# Patient Record
Sex: MALE | Race: WHITE | NOT HISPANIC OR LATINO | Employment: FULL TIME | URBAN - METROPOLITAN AREA
[De-identification: names, ages, dates, MRNs, and addresses within clinical notes are randomized per-mention and may not be internally consistent; named-entity substitution may affect disease eponyms.]

---

## 2020-01-23 ENCOUNTER — OFFICE VISIT (OUTPATIENT)
Dept: URGENT CARE | Facility: CLINIC | Age: 55
End: 2020-01-23

## 2020-01-23 VITALS
DIASTOLIC BLOOD PRESSURE: 93 MMHG | HEART RATE: 107 BPM | OXYGEN SATURATION: 97 % | RESPIRATION RATE: 18 BRPM | HEIGHT: 67 IN | SYSTOLIC BLOOD PRESSURE: 143 MMHG | TEMPERATURE: 100 F | BODY MASS INDEX: 36.57 KG/M2 | WEIGHT: 233 LBS

## 2020-01-23 DIAGNOSIS — L03.116 CELLULITIS OF LEFT KNEE: Primary | ICD-10-CM

## 2020-01-23 PROCEDURE — 99214 OFFICE O/P EST MOD 30 MIN: CPT | Mod: TIER,S$GLB,, | Performed by: FAMILY MEDICINE

## 2020-01-23 PROCEDURE — 99214 PR OFFICE/OUTPT VISIT, EST, LEVL IV, 30-39 MIN: ICD-10-PCS | Mod: TIER,S$GLB,, | Performed by: FAMILY MEDICINE

## 2020-01-23 RX ORDER — SULFAMETHOXAZOLE AND TRIMETHOPRIM 800; 160 MG/1; MG/1
1 TABLET ORAL 2 TIMES DAILY
Qty: 20 TABLET | Refills: 0 | Status: SHIPPED | OUTPATIENT
Start: 2020-01-23

## 2020-01-23 RX ORDER — CLINDAMYCIN HYDROCHLORIDE 300 MG/1
300 CAPSULE ORAL 3 TIMES DAILY
Qty: 30 CAPSULE | Refills: 0 | Status: SHIPPED | OUTPATIENT
Start: 2020-01-23 | End: 2020-02-02

## 2020-01-23 NOTE — PROGRESS NOTES
"Subjective:       Patient ID: Guillermo Garcia is a 54 y.o. male.    Vitals:  height is 5' 6.5" (1.689 m) and weight is 105.7 kg (233 lb). His oral temperature is 99.9 °F (37.7 °C). His blood pressure is 143/93 (abnormal) and his pulse is 107. His respiration is 18 and oxygen saturation is 97%.     Chief Complaint: Knee Pain    Pt present today with Lt knee swelling. Symptoms started before New Years, pt has been hitting it off and on.     Knee Pain    The incident occurred more than 1 week ago. The incident occurred at home. The injury mechanism was a direct blow. The pain is present in the left knee. The quality of the pain is described as aching and shooting. The pain is at a severity of 10/10. The pain is mild. The pain has been constant since onset. Associated symptoms include an inability to bear weight. Pertinent negatives include no loss of motion, loss of sensation, muscle weakness, numbness or tingling. It is unknown if a foreign body is present. He has tried acetaminophen for the symptoms. The treatment provided no relief.       Musculoskeletal: Positive for pain, joint swelling and pain with walking.   Skin: Positive for color change.   Neurological: Negative for numbness.       Objective:      Physical Exam   Constitutional: He is oriented to person, place, and time. He appears well-developed and well-nourished. He is cooperative.  Non-toxic appearance. He does not appear ill. No distress.   HENT:   Head: Normocephalic and atraumatic.   Right Ear: Hearing, tympanic membrane and ear canal normal.   Left Ear: Hearing, tympanic membrane and ear canal normal.   Nose: Nose normal. No mucosal edema, rhinorrhea or nasal deformity. No epistaxis. Right sinus exhibits no maxillary sinus tenderness and no frontal sinus tenderness. Left sinus exhibits no maxillary sinus tenderness and no frontal sinus tenderness.   Mouth/Throat: Uvula is midline and mucous membranes are normal. No trismus in the jaw. Normal " dentition. No uvula swelling. No posterior oropharyngeal erythema.   Eyes: Conjunctivae and lids are normal. Right eye exhibits no discharge. Left eye exhibits no discharge. No scleral icterus.   Neck: Trachea normal, normal range of motion, full passive range of motion without pain and phonation normal. Neck supple.   Cardiovascular: Normal rate, intact distal pulses and normal pulses.   Pulmonary/Chest: Effort normal. No respiratory distress.   Abdominal: Normal appearance. He exhibits no distension, no pulsatile midline mass and no mass. There is no tenderness.   Musculoskeletal: He exhibits tenderness. He exhibits no edema or deformity.        Left knee: He exhibits swelling and erythema.        Legs:  Neurological: He is alert and oriented to person, place, and time. He exhibits normal muscle tone. Coordination normal.   Skin: Skin is warm, dry, intact, not diaphoretic and not pale.   Psychiatric: He has a normal mood and affect. His speech is normal and behavior is normal. Judgment and thought content normal. Cognition and memory are normal.   Nursing note and vitals reviewed.        Assessment:       1. Cellulitis of left knee        Plan:         Cellulitis of left knee    Other orders  -     sulfamethoxazole-trimethoprim 800-160mg (BACTRIM DS) 800-160 mg Tab; Take 1 tablet by mouth 2 (two) times daily.  Dispense: 20 tablet; Refill: 0  -     clindamycin (CLEOCIN) 300 MG capsule; Take 1 capsule (300 mg total) by mouth 3 (three) times daily. for 10 days  Dispense: 30 capsule; Refill: 0    appears to be superficial cellulitis- d/w pt regarding worsening signs and sx for a septic joint and given strict ER precautions.

## 2021-04-17 ENCOUNTER — IMMUNIZATION (OUTPATIENT)
Dept: PRIMARY CARE CLINIC | Facility: CLINIC | Age: 56
End: 2021-04-17

## 2021-04-17 DIAGNOSIS — Z23 NEED FOR VACCINATION: Primary | ICD-10-CM

## 2021-04-17 PROCEDURE — 91300 COVID-19, MRNA, LNP-S, PF, 30 MCG/0.3 ML DOSE VACCINE: CPT | Mod: S$GLB,,, | Performed by: FAMILY MEDICINE

## 2021-04-17 PROCEDURE — 0001A COVID-19, MRNA, LNP-S, PF, 30 MCG/0.3 ML DOSE VACCINE: CPT | Mod: CV19,S$GLB,, | Performed by: FAMILY MEDICINE

## 2021-04-17 PROCEDURE — 0001A COVID-19, MRNA, LNP-S, PF, 30 MCG/0.3 ML DOSE VACCINE: ICD-10-PCS | Mod: CV19,S$GLB,, | Performed by: FAMILY MEDICINE

## 2021-04-17 PROCEDURE — 91300 COVID-19, MRNA, LNP-S, PF, 30 MCG/0.3 ML DOSE VACCINE: ICD-10-PCS | Mod: S$GLB,,, | Performed by: FAMILY MEDICINE

## 2021-05-10 ENCOUNTER — IMMUNIZATION (OUTPATIENT)
Dept: PRIMARY CARE CLINIC | Facility: CLINIC | Age: 56
End: 2021-05-10

## 2021-05-10 DIAGNOSIS — Z23 NEED FOR VACCINATION: Primary | ICD-10-CM

## 2021-05-10 PROCEDURE — 91300 COVID-19, MRNA, LNP-S, PF, 30 MCG/0.3 ML DOSE VACCINE: ICD-10-PCS | Mod: S$GLB,,, | Performed by: FAMILY MEDICINE

## 2021-05-10 PROCEDURE — 0002A COVID-19, MRNA, LNP-S, PF, 30 MCG/0.3 ML DOSE VACCINE: CPT | Mod: CV19,S$GLB,, | Performed by: FAMILY MEDICINE

## 2021-05-10 PROCEDURE — 0002A COVID-19, MRNA, LNP-S, PF, 30 MCG/0.3 ML DOSE VACCINE: ICD-10-PCS | Mod: CV19,S$GLB,, | Performed by: FAMILY MEDICINE

## 2021-05-10 PROCEDURE — 91300 COVID-19, MRNA, LNP-S, PF, 30 MCG/0.3 ML DOSE VACCINE: CPT | Mod: S$GLB,,, | Performed by: FAMILY MEDICINE

## 2024-12-19 PROBLEM — C61 PROSTATE CANCER: Status: ACTIVE | Noted: 2024-12-19

## 2025-01-06 ENCOUNTER — OFFICE VISIT (OUTPATIENT)
Dept: FAMILY MEDICINE | Facility: CLINIC | Age: 60
End: 2025-01-06
Payer: COMMERCIAL

## 2025-01-06 VITALS
DIASTOLIC BLOOD PRESSURE: 82 MMHG | HEART RATE: 72 BPM | BODY MASS INDEX: 34.2 KG/M2 | OXYGEN SATURATION: 98 % | SYSTOLIC BLOOD PRESSURE: 142 MMHG | WEIGHT: 211.88 LBS

## 2025-01-06 DIAGNOSIS — Z00.00 PREVENTATIVE HEALTH CARE: ICD-10-CM

## 2025-01-06 DIAGNOSIS — E03.8 TSH (THYROID-STIMULATING HORMONE DEFICIENCY): ICD-10-CM

## 2025-01-06 DIAGNOSIS — R05.9 COUGH, UNSPECIFIED TYPE: ICD-10-CM

## 2025-01-06 DIAGNOSIS — Z12.11 ENCOUNTER FOR SCREENING FOR MALIGNANT NEOPLASM OF COLON: Primary | ICD-10-CM

## 2025-01-06 DIAGNOSIS — R03.0 ELEVATED BLOOD PRESSURE READING: ICD-10-CM

## 2025-01-06 DIAGNOSIS — F41.9 ANXIETY: ICD-10-CM

## 2025-01-06 DIAGNOSIS — C61 PROSTATE CANCER: ICD-10-CM

## 2025-01-06 DIAGNOSIS — R09.82 POSTNASAL DRIP: ICD-10-CM

## 2025-01-06 PROCEDURE — 99999 PR PBB SHADOW E&M-EST. PATIENT-LVL III: CPT | Mod: PBBFAC,,,

## 2025-01-06 PROCEDURE — 99204 OFFICE O/P NEW MOD 45 MIN: CPT | Mod: S$GLB,,,

## 2025-01-06 RX ORDER — FLUTICASONE PROPIONATE 50 MCG
1 SPRAY, SUSPENSION (ML) NASAL 2 TIMES DAILY PRN
Qty: 11.1 ML | Refills: 2 | Status: SHIPPED | OUTPATIENT
Start: 2025-01-06

## 2025-01-06 RX ORDER — BENZONATATE 100 MG/1
100 CAPSULE ORAL 3 TIMES DAILY PRN
Qty: 20 CAPSULE | Refills: 0 | Status: SHIPPED | OUTPATIENT
Start: 2025-01-06 | End: 2025-01-16

## 2025-01-06 RX ORDER — FLUOXETINE HYDROCHLORIDE 20 MG/1
20 CAPSULE ORAL DAILY
Qty: 90 CAPSULE | Refills: 2 | Status: SHIPPED | OUTPATIENT
Start: 2025-01-06

## 2025-01-06 NOTE — PROGRESS NOTES
Patient ID: Guillermo Garcia is a 59 y.o. male.    Chief Complaint: Establish Care    History of Present Illness    CHIEF COMPLAINT:  Guillermo presents today for follow-up after prostate cancer surgery.    PROSTATE CANCER:  He was diagnosed with prostate cancer in October of last year and underwent robotic prostatectomy in December. He opted for surgery over chemotherapy or monitoring due to significant family history. Both his maternal grandfather and great-grandfather  from prostate cancer, while his father has prostate problems without cancer diagnosis.    POST-SURGICAL SYMPTOMS:  He reports painful bowel movements post-surgery. He experiences occasional diarrhea triggered by heat or milk consumption.    MENTAL HEALTH:  He reports improvement in mood, anxiety, and focus since starting Prozac 20mg daily in September. His wife and boss have noticed improvement in his behavior and task management. Previously took Lexapro between 8863-1633 but discontinued due to excessive fatigue and lack of motivation. He currently denies feelings of nervousness, anxiety, or excessive worry.    SLEEP:  He reports difficulty initiating sleep several days over the past two weeks.    SOCIAL HISTORY:  He works as a , which involves significant walking and lifting. He quit smoking 15 years ago after smoking 1-1.5 packs per day.    DIET:  He cooks with olive oil, avoids fried foods, and does not add salt to his food.    SURGICAL HISTORY:  History of appendectomy and robotic prostatectomy.      ROS:  Gastrointestinal: reports abdominal pain, reports diarrhea  Psychiatric: denies anxiety, reports sleep difficulty         Patient Active Problem List   Diagnosis    Prostate cancer       Current Outpatient Medications on File Prior to Visit   Medication Sig Dispense Refill    ondansetron (ZOFRAN-ODT) 4 MG TbDL Take 4 mg by mouth every 8 (eight) hours as needed.      [DISCONTINUED] FLUoxetine 20 MG capsule Take 1 capsule by mouth  once daily.      [DISCONTINUED] HYDROcodone-acetaminophen (NORCO) 5-325 mg per tablet Take 1 tablet by mouth every 6 (six) hours as needed for Pain. (Patient not taking: Reported on 1/6/2025) 10 tablet 0    [DISCONTINUED] meclizine (ANTIVERT) 25 mg tablet Take 25 mg by mouth 3 (three) times daily as needed. (Patient not taking: Reported on 1/6/2025)      [DISCONTINUED] sulfamethoxazole-trimethoprim 800-160mg (BACTRIM DS) 800-160 mg Tab Take 1 tablet by mouth once daily. (Patient not taking: Reported on 1/6/2025) 8 tablet 0     No current facility-administered medications on file prior to visit.       Past Medical History:   Diagnosis Date    Prostate cancer 10/2024       Past Surgical History:   Procedure Laterality Date    APPENDECTOMY      PROSTATE SURGERY      ROBOT-ASSISTED LYMPHADENECTOMY N/A 12/19/2024    Procedure: ROBOTIC LYMPHADENECTOMY;  Surgeon: Nic Zhou MD;  Location: [facility] OR;  Service: Urology;  Laterality: N/A;    ROBOTIC PROSTATECTOMY, RADICAL, RETROPUBIC APPROACH N/A 12/19/2024    Procedure: ROBOTIC PROSTATECTOMY, RADICAL;  Surgeon: Nic Zhou MD;  Location: [facility] OR;  Service: Urology;  Laterality: N/A;        Family History   Problem Relation Name Age of Onset    Diabetes Mother Vivek radha     Hypertension Mother Vivek radha     Diabetes Father Jesus Radha     Hypertension Father Jesus Radha     Asthma Father Jesus Radha     Prostate cancer Father Jesus Radha         symptoms, perhaps did not progress to cancer    Cancer Maternal Grandfather Jesus Radha Sr     Prostate cancer Maternal Grandfather Jesus Radha Sr         passed away at 77yo       Social History     Socioeconomic History    Marital status:    Tobacco Use    Smoking status: Former     Current packs/day: 0.00     Average packs/day: 1.5 packs/day for 20.7 years (31.1 ttl pk-yrs)     Types: Cigarettes     Start date: 1/1/1986     Quit date: 10/1/2006     Years since  quittin.2     Passive exposure: Never    Smokeless tobacco: Never    Tobacco comments:     Quit smoking 15 years ago.    Substance and Sexual Activity    Alcohol use: Yes     Comment: occasional beer    Drug use: Never    Sexual activity: Not Currently     Partners: Female     Birth control/protection: Condom     Social Drivers of Health     Financial Resource Strain: Low Risk  (2025)    Overall Financial Resource Strain (CARDIA)     Difficulty of Paying Living Expenses: Not very hard   Food Insecurity: No Food Insecurity (2025)    Hunger Vital Sign     Worried About Running Out of Food in the Last Year: Never true     Ran Out of Food in the Last Year: Never true   Transportation Needs: No Transportation Needs (2024)    TRANSPORTATION NEEDS     Transportation : No   Physical Activity: Insufficiently Active (2025)    Exercise Vital Sign     Days of Exercise per Week: 2 days     Minutes of Exercise per Session: 20 min   Stress: No Stress Concern Present (2025)    [place] [employer] - Occupational Stress Questionnaire     Feeling of Stress : Only a little   Recent Concern: Stress - Stress Concern Present (2024)    [place] [place] [employer] - Occupational Stress Questionnaire     Feeling of Stress : To some extent   Housing Stability: High Risk (2025)    Housing Stability Vital Sign     Unable to Pay for Housing in the Last Year: Yes     Homeless in the Last Year: No       Review of patient's allergies indicates:  No Known Allergies     Health Maintenance Due   Topic Date Due    Hepatitis C Screening  Never done    Lipid Panel  Never done    HIV Screening  Never done    TETANUS VACCINE  Never done    Shingles Vaccine (1 of 2) Never done    Pneumococcal Vaccines (Age 50+) (1 of 2 - PCV) Never done    Hemoglobin A1c (Diabetic Prevention Screening)  Never done    Colorectal Cancer Screening  Never done    COVID-19 Vaccine (3 - Pfizer risk series)  06/07/2021    Influenza Vaccine (1) Never done       Objective     Vitals:    01/06/25 1033   BP: (!) 142/82   Pulse: 72      Body mass index is 34.2 kg/m².     Physical Exam  Vitals and nursing note reviewed.   Constitutional:       General: He is not in acute distress.     Appearance: Normal appearance. He is not ill-appearing.   HENT:      Head: Normocephalic and atraumatic.      Nose: Nose normal.      Mouth/Throat:      Mouth: Mucous membranes are moist.   Eyes:      Extraocular Movements: Extraocular movements intact.   Cardiovascular:      Rate and Rhythm: Normal rate and regular rhythm.      Pulses: Normal pulses.      Heart sounds: Normal heart sounds. No murmur heard.     No friction rub. No gallop.   Pulmonary:      Effort: Pulmonary effort is normal. No respiratory distress.      Breath sounds: Normal breath sounds. No stridor. No wheezing, rhonchi or rales.   Abdominal:      General: Bowel sounds are normal. There is no distension.      Palpations: Abdomen is soft.      Tenderness: There is no abdominal tenderness. There is no guarding.   Musculoskeletal:      Cervical back: Neck supple.      Right lower leg: No edema.      Left lower leg: No edema.   Lymphadenopathy:      Cervical: No cervical adenopathy.   Skin:     General: Skin is warm and dry.   Neurological:      Mental Status: He is alert and oriented to person, place, and time.   Psychiatric:         Mood and Affect: Mood normal.         Behavior: Behavior normal.         Assessment & Plan    Continued fluoxetine 20mg daily for anxiety management  No indications for increasing fluoxetine dose or adding additional medications, given minimal depression/anxiety scores and patient's reported improvement  Surgical incisions from prostatectomy healing appropriately without signs of infection  Consider future stress test to proactively assess cardiovascular health, given patient's risk factors    PROSTATE CANCER:  - Diagnosed the patient with prostate  cancer in October, which was contained and surgically removed in December via robotic prostatectomy.  - Recommend annual PSA level testing to monitor for recurrence of prostate cancer.  - Noted that the patient chose surgery over chemotherapy or radiation due to his age and the contained nature of the cancer.  - Assessed the surgical site and found no signs of infection, with some tenderness reported.  - Recommend OTCMiralax, half a packet daily as needed, to manage post-surgical bowel discomfort.  - Instructed the patient to contact the office if surgical incision sites become warm, red, swollen, or show drainage.  - Confirmed the family history of prostate cancer, noting the grandfather was diagnosed in his mid-70s.  - Acknowledged the strong family history of prostate cancer and its influence on the patient's treatment decisions.  - Noted that grandfather and great-grandfather  from prostate cancer.    ANXIETY:  - Administered an anxiety questionnaire, with the patient scoring low, indicating minimal symptoms.  - Assessed that the current medication (fluoxetine 20mg daily) is effectively controlling anxiety symptoms.  - Continued fluoxetine 20mg daily for anxiety management and provided a 90-day supply.  - Noted that the patient reports feeling aggravated quickly at certain things and having trouble focusing.    ELEVATED BLOOD PRESSURE:  - Measured the patient's blood pressure at 142/82 during the visit, noting it as higher than usual but previous readings have been normal.  - Assessed the elevated blood pressure as potentially due to the office visit and not necessarily indicative of hypertension.  - Recommend home blood pressure monitoring and lifestyle modifications, including reduced salt intake and increased water consumption.  - Instructed the patient to contact the office if experiencing chest pain, shortness of breath, or other concerning cardiovascular symptoms.    DIARRHEA:  - Evaluated potential  causes of diarrhea, including heat and milk consumption.  - Assessed that the patient may be lactose intolerant.  - Recommend increasing fiber intake to 20-30 g per day.  - Provided information on lactase enzyme supplements for managing lactose intolerance symptoms.  - Advised considering lactase enzyme supplements when consuming dairy products.    SMOKING CESSATION:  - Evaluated the patient's smoking history, noting that he smoked about 1 PPD for an unspecified period.  - Acknowledged the patient's successful smoking cessation approximately 15 years ago.  - No current treatment needed as the patient successfully quit smoking 15 years ago.    CARDIOVASCULAR HEALTH:  - Educated on Mediterranean diet and its benefits for cardiovascular health.  - Discussed importance of daily 30-minute exercise routine for cardiovascular health.  - Explained benefits of increasing fiber intake to 20-30 g daily and maintaining adequate hydration for bowel health.  - Guillermo to maintain current exercise   routine, aiming for 30 minutes of daily activity.  - Recommend increasing fiber intake to 20-30 g daily.  - Guillermo to ensure adequate hydration with 64 oz of water daily.    LABS:  - Ordered lipid panel, thyroid function tests, hemoglobin A1C, and liver function tests.  - Complete ordered lab work at patient's convenience, either immediately after visit or on a weekend.    COLONOSCOPY REFERRAL:  - Colonoscopy order placed for future scheduling in [place].    BEHAVIORAL HEALTH REFERRAL:  - Referred the patient to behavioral health for continued cancer-related psychological support.    FOLLOW UP:  - Scheduled a follow-up visit in 6 weeks.  - Follow up in 6 months.         Encounter for screening for malignant neoplasm of colon  -     Case Request Endoscopy: COLONOSCOPY    Elevated blood pressure reading  -     LIPID PANEL; Future; Expected date: 01/06/2025  -     HEMOGLOBIN A1C; Future; Expected date: 01/06/2025  -     TSH; Future;  Expected date: 01/06/2025  -     COMPREHENSIVE METABOLIC PANEL; Future; Expected date: 01/06/2025    Anxiety  -     FLUoxetine 20 MG capsule; Take 1 capsule (20 mg total) by mouth once daily.  Dispense: 90 capsule; Refill: 2  -     TSH; Future; Expected date: 01/06/2025    Prostate cancer  Comments:  recent surgery, will place [facility] referral  Orders:  -     Ambulatory referral/consult to Primary Care Behavioral Health (Non-Opioids); Future; Expected date: 01/13/2025    TSH (thyroid-stimulating hormone deficiency)    Preventative health care  -     LIPID PANEL; Future; Expected date: 01/06/2025  -     HEMOGLOBIN A1C; Future; Expected date: 01/06/2025  -     TSH; Future; Expected date: 01/06/2025    Postnasal drip  -     fluticasone propionate (FLONASE) 50 mcg/actuation nasal spray; 1 spray (50 mcg total) by Each Nostril route 2 (two) times daily as needed for Rhinitis.  Dispense: 11.1 mL; Refill: 2    Cough, unspecified type  -     benzonatate (TESSALON) 100 MG capsule; Take 1 capsule (100 mg total) by mouth 3 (three) times daily as needed for Cough.  Dispense: 20 capsule; Refill: 0        Follow up in about 3 months (around 4/6/2025), or if symptoms worsen or fail to improve.    This note was generated with the assistance of ambient listening technology. Verbal consent was obtained by the patient and accompanying visitor(s) for the recording of patient appointment to facilitate this note. I attest to having reviewed and edited the generated note for accuracy, though some syntax or spelling errors may persist. Please contact the author of this note for any clarification.        Ivania Sen MD  01/06/2025

## 2025-01-08 ENCOUNTER — TELEPHONE (OUTPATIENT)
Dept: GASTROENTEROLOGY | Facility: CLINIC | Age: 60
End: 2025-01-08
Payer: COMMERCIAL

## 2025-01-10 ENCOUNTER — PATIENT MESSAGE (OUTPATIENT)
Dept: PSYCHIATRY | Facility: CLINIC | Age: 60
End: 2025-01-10
Payer: COMMERCIAL

## 2025-01-11 ENCOUNTER — LAB VISIT (OUTPATIENT)
Dept: LAB | Facility: HOSPITAL | Age: 60
End: 2025-01-11
Payer: COMMERCIAL

## 2025-01-11 DIAGNOSIS — Z00.00 PREVENTATIVE HEALTH CARE: ICD-10-CM

## 2025-01-11 DIAGNOSIS — F41.9 ANXIETY: ICD-10-CM

## 2025-01-11 DIAGNOSIS — R03.0 ELEVATED BLOOD PRESSURE READING: ICD-10-CM

## 2025-01-11 LAB
ALBUMIN SERPL BCP-MCNC: 3.3 G/DL (ref 3.5–5.2)
ALP SERPL-CCNC: 58 U/L (ref 40–150)
ALT SERPL W/O P-5'-P-CCNC: 16 U/L (ref 10–44)
ANION GAP SERPL CALC-SCNC: 8 MMOL/L (ref 8–16)
AST SERPL-CCNC: 19 U/L (ref 10–40)
BILIRUB SERPL-MCNC: 0.4 MG/DL (ref 0.1–1)
BUN SERPL-MCNC: 14 MG/DL (ref 6–20)
CALCIUM SERPL-MCNC: 8.5 MG/DL (ref 8.7–10.5)
CHLORIDE SERPL-SCNC: 107 MMOL/L (ref 95–110)
CHOLEST SERPL-MCNC: 192 MG/DL (ref 120–199)
CHOLEST/HDLC SERPL: 5.2 {RATIO} (ref 2–5)
CO2 SERPL-SCNC: 26 MMOL/L (ref 23–29)
CREAT SERPL-MCNC: 1.2 MG/DL (ref 0.5–1.4)
EST. GFR  (NO RACE VARIABLE): >60 ML/MIN/1.73 M^2
ESTIMATED AVG GLUCOSE: 100 MG/DL (ref 68–131)
GLUCOSE SERPL-MCNC: 82 MG/DL (ref 70–110)
HBA1C MFR BLD: 5.1 % (ref 4–5.6)
HDLC SERPL-MCNC: 37 MG/DL (ref 40–75)
HDLC SERPL: 19.3 % (ref 20–50)
LDLC SERPL CALC-MCNC: 135 MG/DL (ref 63–159)
NONHDLC SERPL-MCNC: 155 MG/DL
POTASSIUM SERPL-SCNC: 4.6 MMOL/L (ref 3.5–5.1)
PROT SERPL-MCNC: 6.2 G/DL (ref 6–8.4)
SODIUM SERPL-SCNC: 141 MMOL/L (ref 136–145)
TRIGL SERPL-MCNC: 100 MG/DL (ref 30–150)
TSH SERPL DL<=0.005 MIU/L-ACNC: 0.78 UIU/ML (ref 0.4–4)

## 2025-01-11 PROCEDURE — 80061 LIPID PANEL: CPT

## 2025-01-11 PROCEDURE — 80053 COMPREHEN METABOLIC PANEL: CPT

## 2025-01-11 PROCEDURE — 84443 ASSAY THYROID STIM HORMONE: CPT

## 2025-01-11 PROCEDURE — 83036 HEMOGLOBIN GLYCOSYLATED A1C: CPT

## 2025-01-11 PROCEDURE — 36415 COLL VENOUS BLD VENIPUNCTURE: CPT | Mod: PO

## 2025-01-27 ENCOUNTER — PATIENT MESSAGE (OUTPATIENT)
Dept: PSYCHIATRY | Facility: CLINIC | Age: 60
End: 2025-01-27
Payer: COMMERCIAL

## 2025-02-18 ENCOUNTER — PATIENT MESSAGE (OUTPATIENT)
Dept: ADMINISTRATIVE | Facility: HOSPITAL | Age: 60
End: 2025-02-18
Payer: COMMERCIAL

## 2025-02-28 ENCOUNTER — TELEPHONE (OUTPATIENT)
Dept: FAMILY MEDICINE | Facility: CLINIC | Age: 60
End: 2025-02-28
Payer: COMMERCIAL

## 2025-02-28 ENCOUNTER — PATIENT OUTREACH (OUTPATIENT)
Dept: ADMINISTRATIVE | Facility: HOSPITAL | Age: 60
End: 2025-02-28
Payer: COMMERCIAL

## 2025-03-10 ENCOUNTER — TELEPHONE (OUTPATIENT)
Dept: GASTROENTEROLOGY | Facility: CLINIC | Age: 60
End: 2025-03-10
Payer: COMMERCIAL

## 2025-03-10 NOTE — TELEPHONE ENCOUNTER
Attempted to reach pt to schedule scope from case request. Left VM, call back number provided. Several attempts have been made to contact pt to schedule ordered procedure. Case request canceled. Letter placed in mail. Pt will be scheduled from this request upon call back.

## 2025-04-14 ENCOUNTER — TELEPHONE (OUTPATIENT)
Dept: GASTROENTEROLOGY | Facility: CLINIC | Age: 60
End: 2025-04-14
Payer: COMMERCIAL

## 2025-04-14 NOTE — TELEPHONE ENCOUNTER
----- Message from KALLI Rodríguez sent at 4/14/2025  1:27 PM CDT -----    ----- Message -----  From: Yashira Srinivasan  Sent: 4/14/2025   1:14 PM CDT  To: [facility]nal[facility] J[facility] Staff    Type: Needs Medical AdviceWho Called:  Safia( spouse)Symptoms (please be specific):  How long has patient had these symptoms:  Pharmacy name and phone #:  Best Call Back Number: [phone] Information: pt is requesting a call back from nurse regarding upcoming procedure

## 2025-05-07 NOTE — H&P
COLONOSCOPY HISTORY AND PE    Procedure : Colonoscopy      INDICATIONS: asymptomatic screening exam      Past Medical History:   Diagnosis Date    Prostate cancer 10/2024       Past Surgical History:   Procedure Laterality Date    APPENDECTOMY      PROSTATE SURGERY      ROBOT-ASSISTED LYMPHADENECTOMY N/A 12/19/2024    Procedure: ROBOTIC LYMPHADENECTOMY;  Surgeon: Nic Zhou MD;  Location: [facility];  Service: Urology;  Laterality: N/A;    ROBOTIC PROSTATECTOMY, RADICAL, RETROPUBIC APPROACH N/A 12/19/2024    Procedure: ROBOTIC PROSTATECTOMY, RADICAL;  Surgeon: Nic Zhou MD;  Location: [facility];  Service: Urology;  Laterality: N/A;       Review of patient's allergies indicates:  No Known Allergies    Medications Ordered Prior to Encounter[1]    Family History   Problem Relation Name Age of Onset    Diabetes Mother Vivek radha     Hypertension Mother Vivek radha     Diabetes Father Jesus Radha     Hypertension Father Jesus Radha     Asthma Father Jesus Radha     Prostate cancer Father Jesus Radha         symptoms, perhaps did not progress to cancer    Cancer Maternal Grandfather Jesus Radha Sr     Prostate cancer Maternal Grandfather Jesus Radha Sr         passed away at 79yo       Social History[2]    Review of Systems -    Respiratory : no cough, shortness of breath, or wheezing  Cardiovascular  no chest pain or dyspnea on exertion  Gastrointestinal no abdominal pain, change in bowel habits, or black or bloody stools  Musculoskeletal no deformities, swelling  Neurological no TIA or stroke symptoms        Physical Exam:  General: NAD  AT NC EOMI  Mallampati Score   Neck supple, trachea midline  Lungs: nl excursions, no retractions.  Breathing comfortably  Abdomen ND soft NT.  No masses  Extremities: No CCE.      ASA:  II    PLAN  COLONOSCOPY.  The details of the procedure, the possible need for biopsy or polypectomy and the potential risks including bleeding,  perforation, missed polyps were discussed in detail.           [1]   No current facility-administered medications on file prior to encounter.     Current Outpatient Medications on File Prior to Encounter   Medication Sig Dispense Refill    FLUoxetine 20 MG capsule Take 1 capsule (20 mg total) by mouth once daily. 90 capsule 2    fluticasone propionate (FLONASE) 50 mcg/actuation nasal spray 1 spray (50 mcg total) by Each Nostril route 2 (two) times daily as needed for Rhinitis. 11.1 mL 2    ondansetron (ZOFRAN-ODT) 4 MG TbDL Take 4 mg by mouth every 8 (eight) hours as needed.     [2]   Social History  Socioeconomic History    Marital status:    Tobacco Use    Smoking status: Former     Current packs/day: 0.00     Average packs/day: 1.5 packs/day for 20.7 years (31.1 ttl pk-yrs)     Types: Cigarettes     Start date: 1986     Quit date: 10/1/2006     Years since quittin.6     Passive exposure: Never    Smokeless tobacco: Never    Tobacco comments:     Quit smoking 15 years ago.    Substance and Sexual Activity    Alcohol use: Yes     Comment: occasional beer    Drug use: Never    Sexual activity: Not Currently     Partners: Female     Birth control/protection: Condom     Social Drivers of Health     Financial Resource Strain: Low Risk  (2025)    Overall Financial Resource Strain (CARDIA)     Difficulty of Paying Living Expenses: Not very hard   Food Insecurity: No Food Insecurity (2025)    Hunger Vital Sign     Worried About Running Out of Food in the Last Year: Never true     Ran Out of Food in the Last Year: Never true   Transportation Needs: No Transportation Needs (2024)    TRANSPORTATION NEEDS     Transportation : No   Physical Activity: Insufficiently Active (2025)    Exercise Vital Sign     Days of Exercise per Week: 2 days     Minutes of Exercise per Session: 20 min   Stress: No Stress Concern Present (2025)    [employer] [place] [employer] - Occupational Stress  Questionnaire     Feeling of Stress : Only a little   Recent Concern: Stress - Stress Concern Present (12/20/2024)    [place] [employer] - Occupational Stress Questionnaire     Feeling of Stress : To some extent   Housing Stability: High Risk (1/6/2025)    Housing Stability Vital Sign     Unable to Pay for Housing in the Last Year: Yes     Homeless in the Last Year: No

## 2025-05-08 ENCOUNTER — ANESTHESIA EVENT (OUTPATIENT)
Dept: ENDOSCOPY | Facility: HOSPITAL | Age: 60
End: 2025-05-08
Payer: COMMERCIAL

## 2025-05-08 ENCOUNTER — HOSPITAL ENCOUNTER (OUTPATIENT)
Facility: HOSPITAL | Age: 60
Discharge: HOME | End: 2025-05-08
Attending: COLON & RECTAL SURGERY | Admitting: COLON & RECTAL SURGERY
Payer: COMMERCIAL

## 2025-05-08 ENCOUNTER — ANESTHESIA (OUTPATIENT)
Dept: ENDOSCOPY | Facility: HOSPITAL | Age: 60
End: 2025-05-08
Payer: COMMERCIAL

## 2025-05-08 ENCOUNTER — HOSPITAL ENCOUNTER (OUTPATIENT)
Dept: RADIOLOGY | Facility: HOSPITAL | Age: 60
Discharge: HOME | End: 2025-05-08
Attending: COLON & RECTAL SURGERY | Admitting: COLON & RECTAL SURGERY
Payer: COMMERCIAL

## 2025-05-08 DIAGNOSIS — Z12.11 SCREEN FOR COLON CANCER: ICD-10-CM

## 2025-05-08 DIAGNOSIS — Z12.11 ENCOUNTER FOR SCREENING FOR MALIGNANT NEOPLASM OF COLON: ICD-10-CM

## 2025-05-08 DIAGNOSIS — Z12.11 SCREEN FOR COLON CANCER: Primary | ICD-10-CM

## 2025-05-08 PROCEDURE — 74018 RADEX ABDOMEN 1 VIEW: CPT | Mod: 26,,, | Performed by: STUDENT IN AN ORGANIZED HEALTH CARE EDUCATION/TRAINING PROGRAM

## 2025-05-08 PROCEDURE — 45385 COLONOSCOPY W/LESION REMOVAL: CPT | Mod: 33,,, | Performed by: COLON & RECTAL SURGERY

## 2025-05-08 PROCEDURE — 74018 RADEX ABDOMEN 1 VIEW: CPT | Mod: TC,FY,PO

## 2025-05-08 PROCEDURE — 27201028 HC NEEDLE, SCLERO: Mod: PO | Performed by: COLON & RECTAL SURGERY

## 2025-05-08 PROCEDURE — 88305 TISSUE EXAM BY PATHOLOGIST: CPT | Mod: TC,91 | Performed by: COLON & RECTAL SURGERY

## 2025-05-08 PROCEDURE — 27200997: Mod: PO | Performed by: COLON & RECTAL SURGERY

## 2025-05-08 PROCEDURE — 45381 COLONOSCOPY SUBMUCOUS NJX: CPT | Mod: 33,51,, | Performed by: COLON & RECTAL SURGERY

## 2025-05-08 PROCEDURE — 63600175 PHARM REV CODE 636 W HCPCS: Mod: PO | Performed by: NURSE ANESTHETIST, CERTIFIED REGISTERED

## 2025-05-08 PROCEDURE — 63600175 PHARM REV CODE 636 W HCPCS: Mod: PO | Performed by: COLON & RECTAL SURGERY

## 2025-05-08 PROCEDURE — 37000009 HC ANESTHESIA EA ADD 15 MINS: Mod: PO | Performed by: COLON & RECTAL SURGERY

## 2025-05-08 PROCEDURE — 45381 COLONOSCOPY SUBMUCOUS NJX: CPT | Mod: 33,PO | Performed by: COLON & RECTAL SURGERY

## 2025-05-08 PROCEDURE — 27202363 HC INJECTION AGENT, SUBMUCOSAL, ANY: Mod: PO | Performed by: COLON & RECTAL SURGERY

## 2025-05-08 PROCEDURE — 45385 COLONOSCOPY W/LESION REMOVAL: CPT | Mod: 33,PO | Performed by: COLON & RECTAL SURGERY

## 2025-05-08 PROCEDURE — 37000008 HC ANESTHESIA 1ST 15 MINUTES: Mod: PO | Performed by: COLON & RECTAL SURGERY

## 2025-05-08 PROCEDURE — 27201089 HC SNARE, DISP (ANY): Mod: PO | Performed by: COLON & RECTAL SURGERY

## 2025-05-08 RX ORDER — SODIUM CHLORIDE, SODIUM LACTATE, POTASSIUM CHLORIDE, CALCIUM CHLORIDE 600; 310; 30; 20 MG/100ML; MG/100ML; MG/100ML; MG/100ML
INJECTION, SOLUTION INTRAVENOUS CONTINUOUS
Status: DISCONTINUED | OUTPATIENT
Start: 2025-05-08 | End: 2025-05-08 | Stop reason: HOSPADM

## 2025-05-08 RX ORDER — PROPOFOL 10 MG/ML
VIAL (ML) INTRAVENOUS
Status: DISCONTINUED | OUTPATIENT
Start: 2025-05-08 | End: 2025-05-08

## 2025-05-08 RX ORDER — SODIUM CHLORIDE 0.9 % (FLUSH) 0.9 %
10 SYRINGE (ML) INJECTION
Status: DISCONTINUED | OUTPATIENT
Start: 2025-05-08 | End: 2025-05-08 | Stop reason: HOSPADM

## 2025-05-08 RX ORDER — LIDOCAINE HYDROCHLORIDE 20 MG/ML
INJECTION INTRAVENOUS
Status: DISCONTINUED | OUTPATIENT
Start: 2025-05-08 | End: 2025-05-08

## 2025-05-08 RX ADMIN — PROPOFOL 40 MG: 10 INJECTION, EMULSION INTRAVENOUS at 12:05

## 2025-05-08 RX ADMIN — SODIUM CHLORIDE, POTASSIUM CHLORIDE, SODIUM LACTATE AND CALCIUM CHLORIDE: 600; 310; 30; 20 INJECTION, SOLUTION INTRAVENOUS at 11:05

## 2025-05-08 RX ADMIN — PROPOFOL 50 MG: 10 INJECTION, EMULSION INTRAVENOUS at 12:05

## 2025-05-08 RX ADMIN — LIDOCAINE HYDROCHLORIDE 75 MG: 20 INJECTION INTRAVENOUS at 12:05

## 2025-05-08 RX ADMIN — PROPOFOL 120 MG: 10 INJECTION, EMULSION INTRAVENOUS at 12:05

## 2025-05-08 NOTE — ANESTHESIA POSTPROCEDURE EVALUATION
Anesthesia Post Evaluation    Patient: Guillermo Garcia    Procedure(s) Performed: Procedure(s) (LRB):  COLONOSCOPY, SCREENING, LOW RISK PATIENT (N/A)    Final Anesthesia Type: general      Patient location during evaluation: PACU  Patient participation: Yes- Able to Participate  Level of consciousness: awake  Post-procedure vital signs: reviewed and stable  Pain management: adequate  Airway patency: patent    PONV status at discharge: No PONV  Anesthetic complications: no      Cardiovascular status: blood pressure returned to baseline  Respiratory status: unassisted  Hydration status: euvolemic  Follow-up not needed.              Vitals Value Taken Time   /73 05/08/25 13:45   Temp  05/08/25 13:57   Pulse 65 05/08/25 13:45   Resp 15 05/08/25 13:45   SpO2 100 % 05/08/25 13:45         Event Time   Out of Recovery 13:52:36         Pain/Jose Ramon Score: Pain Rating Prior to Med Admin: 0 (5/8/2025 12:59 PM)  Pain Rating Post Med Admin: 0 (5/8/2025 12:59 PM)  Jose Ramon Score: 10 (5/8/2025  1:51 PM)

## 2025-05-08 NOTE — TRANSFER OF CARE
"Anesthesia Transfer of Care Note    Patient: Guillermo Garcia    Procedure(s) Performed: Procedure(s) (LRB):  COLONOSCOPY, SCREENING, LOW RISK PATIENT (N/A)    Patient location: PACU    Anesthesia Type: general    Transport from OR: Transported from OR on room air with adequate spontaneous ventilation    Post pain: adequate analgesia    Post assessment: no apparent anesthetic complications    Post vital signs: stable    Level of consciousness: awake and sedated    Nausea/Vomiting: no nausea/vomiting    Complications: none    Transfer of care protocol was followed      Last vitals: Visit Vitals  BP (!) 102/58   Pulse 71   Temp 36.5 °C (97.7 °F) (Skin)   Resp 13   Ht 5' 6" (1.676 m)   Wt 95.3 kg (210 lb)   SpO2 98%   BMI 33.89 kg/m²     "

## 2025-05-08 NOTE — ANESTHESIA PREPROCEDURE EVALUATION
05/08/2025  Guillermo Garcia is a 59 y.o., male.      Pre-op Assessment    I have reviewed the Patient Summary Reports.    I have reviewed the NPO Status.   I have reviewed the Medications.     Review of Systems  Anesthesia Hx:  No problems with previous Anesthesia                Social:  Former Smoker       Hematology/Oncology:                        --  Cancer in past history:                 Oncology Comments: Prostate     Cardiovascular:  Cardiovascular Normal                                              Neurological:  Neurology Normal                                      Endocrine:        Obesity / BMI > 30      Physical Exam  General: Well nourished    Airway:  Mallampati: II   Mouth Opening: Normal  TM Distance: Normal  Neck ROM: Normal ROM        Anesthesia Plan  Type of Anesthesia, risks & benefits discussed:    Anesthesia Type: Gen Natural Airway  Intra-op Monitoring Plan: Standard ASA Monitors  Induction:  IV  Informed Consent: Informed consent signed with the Patient and all parties understand the risks and agree with anesthesia plan.  All questions answered.   ASA Score: 2    Ready For Surgery From Anesthesia Perspective.     .

## 2025-05-08 NOTE — PROVATION PATIENT INSTRUCTIONS
Discharge Summary/Instructions after an Endoscopic Procedure  Patient Name: Guillermo Garcia  Patient MRN: [id number]  Patient YOB: 1965  Thursday, May 8, 2025  Corona Mcintosh MD  Dear patient,  As a result of recent federal legislation (The Federal Cures Act), you may   receive lab or pathology results from your procedure in your [facility]   account before your physician is able to contact you. Your physician or   their representative will relay the results to you with their   recommendations at their soonest availability.  Thank you,  RESTRICTIONS:  During your procedure today, you received medications for sedation.  These   medications may affect your judgment, balance and coordination.  Therefore,   for 24 hours, you have the following restrictions:   - DO NOT drive a car, operate machinery, make legal/financial decisions,   sign important papers or drink alcohol.    ACTIVITY:  Today: no heavy lifting, straining or running due to procedural   sedation/anesthesia.  The following day: return to full activity including work.  DIET:  Eat and drink normally unless instructed otherwise.     TREATMENT FOR COMMON SIDE EFFECTS:  - Mild abdominal pain, nausea, belching, bloating or excessive gas:  rest,   eat lightly and use a heating pad.  - Sore Throat: treat with throat lozenges and/or gargle with warm salt   water.  - Because air was used during the procedure, expelling large amounts of air   from your rectum or belching is normal.  - If a bowel prep was taken, you may not have a bowel movement for 1-3 days.    This is normal.  SYMPTOMS TO WATCH FOR AND REPORT TO YOUR PHYSICIAN:  1. Abdominal pain or bloating, other than gas cramps.  2. Chest pain.  3. Back pain.  4. Signs of infection such as: chills or fever occurring within 24 hours   after the procedure.  5. Rectal bleeding, which would show as bright red, maroon, or black stools.   (A tablespoon of blood from the rectum is not serious, especially  if   hemorrhoids are present.)  6. Vomiting.  7. Weakness or dizziness.  GO DIRECTLY TO THE NEAREST EMERGENCY ROOM IF YOU HAVE ANY OF THE FOLLOWING:      Difficulty breathing              Chills and/or fever over 101 F   Persistent vomiting and/or vomiting blood   Severe abdominal pain   Severe chest pain   Black, tarry stools   Bleeding- more than one tablespoon   Any other symptom or condition that you feel may need urgent attention  Your doctor recommends these additional instructions:  If any biopsies were taken, your doctors clinic will contact you in 1 to 2   weeks with any results.  You are being discharged to home.   You have a contact number available for emergencies.  The signs and symptoms   of potential delayed complications were discussed with you.  You may return   to normal activities tomorrow.  Written discharge instructions were   provided to you.   Resume your previous diet.   Continue your present medications.   We are waiting for your pathology results.   Your physician has recommended a repeat colonoscopy in one year for   surveillance.   Return to my office in two weeks.  For questions, problems or results please call your physician - Corona Mcintosh MD at Work:  [phone].  EMERGENCY PHONE NUMBER: [phone], LAB RESULTS: [phone]  IF A COMPLICATION OR EMERGENCY SITUATION ARISES AND YOU ARE UNABLE TO REACH   YOUR PHYSICIAN - GO DIRECTLY TO THE EMERGENCY ROOM.  ___________________________________________  Nurse Signature  ___________________________________________  Patient/Designated Responsible Party Signature  Corona Mcintosh MD  5/8/2025 1:06:50 PM  This report has been verified and signed electronically.  Dear patient,  As a result of recent federal legislation (The Federal Cures Act), you may   receive lab or pathology results from your procedure in your [facility]   account before your physician is able to contact you. Your physician or   their representative will relay the  results to you with their   recommendations at their soonest availability.  Thank you.  PROVATION

## 2025-05-09 VITALS
BODY MASS INDEX: 33.75 KG/M2 | OXYGEN SATURATION: 100 % | RESPIRATION RATE: 15 BRPM | DIASTOLIC BLOOD PRESSURE: 73 MMHG | HEART RATE: 65 BPM | WEIGHT: 210 LBS | SYSTOLIC BLOOD PRESSURE: 118 MMHG | TEMPERATURE: 98 F | HEIGHT: 66 IN

## 2025-05-12 LAB
ESTROGEN SERPL-MCNC: NORMAL PG/ML
INSULIN SERPL-ACNC: NORMAL U[IU]/ML
LAB AP CLINICAL INFORMATION: NORMAL
LAB AP GROSS DESCRIPTION: NORMAL
LAB AP PERFORMING LOCATION(S): NORMAL
LAB AP REPORT FOOTNOTES: NORMAL

## 2025-05-14 ENCOUNTER — OFFICE VISIT (OUTPATIENT)
Dept: SURGERY | Facility: CLINIC | Age: 60
End: 2025-05-14
Payer: COMMERCIAL

## 2025-05-14 VITALS
TEMPERATURE: 99 F | SYSTOLIC BLOOD PRESSURE: 108 MMHG | HEART RATE: 68 BPM | HEIGHT: 67 IN | WEIGHT: 206.81 LBS | DIASTOLIC BLOOD PRESSURE: 70 MMHG | BODY MASS INDEX: 32.46 KG/M2 | OXYGEN SATURATION: 97 % | RESPIRATION RATE: 16 BRPM

## 2025-05-14 DIAGNOSIS — K63.5 POLYPOSIS OF COLON: Primary | ICD-10-CM

## 2025-05-14 DIAGNOSIS — K63.89 MASS OF HEPATIC FLEXURE OF COLON: ICD-10-CM

## 2025-05-14 PROCEDURE — 99215 OFFICE O/P EST HI 40 MIN: CPT | Mod: S$GLB,,, | Performed by: COLON & RECTAL SURGERY

## 2025-05-14 PROCEDURE — 99999 PR PBB SHADOW E&M-EST. PATIENT-LVL III: CPT | Mod: PBBFAC,,, | Performed by: COLON & RECTAL SURGERY

## 2025-05-14 NOTE — PROGRESS NOTES
HPI:  Guillermo Garcia is a 59 y.o. male with history of multiple colonic polyps on screening colonoscopy.  He denies abd pain, change in bowels or rectal bleeding     5-  colonoscopy  Findings:       The perianal and digital rectal examinations were normal.        Two pedunculated polyps were found in the proximal sigmoid colon.        The polyps were 4 to 5 mm in size. These polyps were removed with a        cold snare. Resection and retrieval were complete. Verification of        patient identification for the specimen was done. One ligature was        successfully placed. There was no bleeding at the end of the        procedure.        A 6 mm polyp was found in the splenic flexure. The polyp was        semi-pedunculated. The polyp was removed with a cold snare.        Resection and retrieval were complete. Verification of patient        identification for the specimen was done. Estimated blood loss was        minimal.        Five semi-sessile and semi-pedunculated polyps were found in the        transverse colon, proximal transverse colon and mid transverse        colon. The polyps were 3 to 6 mm in size. These polyps were removed        with a cold snare. Resection and retrieval were complete.        Verification of patient identification for the specimen was done.        Estimated blood loss was minimal.        Six semi-sessile and semi-pedunculated polyps were found in the        hepatic flexure and ascending colon. The polyps were 4 to 6 mm in        size. Polypectomy was not attempted due to mass of proximal to mid        transverse colon which will require colectomy.        A frond-like/villous non-obstructing medium-sized mass was found in        the transverse colon. The mass was non-circumferential. No bleeding        was present. Area was tattooed with an injection of 3 mL of Spot        (carbon black). One ligature was successfully placed. This was done        for marking.        The  retroflexed view of the distal rectum and anal verge was normal        and showed no anal or rectal abnormalities.     Final Diagnosis   1.  Colon, transverse, polypectomies:  Fragments of tubular adenoma, negative for high-grade dysplasia.     2. Colon, splenic flexure, polypectomy:   Tubular adenoma, negative for high-grade dysplasia.     3. Colon, left, polypectomies:  Fragments of tubulovillous adenoma and tubular adenoma, negative for high-grade dysplasia.       Past Medical History:   Diagnosis Date    Prostate cancer 10/2024        Past Surgical History:   Procedure Laterality Date    APPENDECTOMY      COLONOSCOPY, SCREENING, LOW RISK PATIENT N/A 5/8/2025    Procedure: COLONOSCOPY, SCREENING, LOW RISK PATIENT;  Surgeon: JENNYFER Mcintosh MD;  Location: [facility] ENDO;  Service: Endoscopy;  Laterality: N/A;    PROSTATE SURGERY      ROBOT-ASSISTED LYMPHADENECTOMY N/A 12/19/2024    Procedure: ROBOTIC LYMPHADENECTOMY;  Surgeon: Nic Zhou MD;  Location: [facility] OR;  Service: Urology;  Laterality: N/A;    ROBOTIC PROSTATECTOMY, RADICAL, RETROPUBIC APPROACH N/A 12/19/2024    Procedure: ROBOTIC PROSTATECTOMY, RADICAL;  Surgeon: Nic Zhou MD;  Location: [facility] OR;  Service: Urology;  Laterality: N/A;       Review of patient's allergies indicates:  No Known Allergies    Family History   Problem Relation Name Age of Onset    Diabetes Mother Vivek radha     Hypertension Mother Vivek radha     Diabetes Father Jesus Radha     Hypertension Father Jesus Radha     Asthma Father Jesus Radha     Prostate cancer Father Jesus Radha         symptoms, perhaps did not progress to cancer    Cancer Maternal Grandfather Jesus Radha Sr     Prostate cancer Maternal Grandfather Jesus Radha Sr         passed away at 79yo       Social History     Socioeconomic History    Marital status:    Tobacco Use    Smoking status: Former     Current packs/day: 0.00     Average packs/day: 1.5  packs/day for 20.7 years (31.1 ttl pk-yrs)     Types: Cigarettes     Start date: 1986     Quit date: 10/1/2006     Years since quittin.6     Passive exposure: Never    Smokeless tobacco: Never    Tobacco comments:     Quit smoking 15 years ago.    Substance and Sexual Activity    Alcohol use: Yes     Comment: occasional beer    Drug use: Never    Sexual activity: Not Currently     Partners: Female     Birth control/protection: Condom     Social Drivers of Health     Financial Resource Strain: Low Risk  (2025)    Overall Financial Resource Strain (CARDIA)     Difficulty of Paying Living Expenses: Not very hard   Food Insecurity: No Food Insecurity (2025)    Hunger Vital Sign     Worried About Running Out of Food in the Last Year: Never true     Ran Out of Food in the Last Year: Never true   Transportation Needs: No Transportation Needs (2024)    TRANSPORTATION NEEDS     Transportation : No   Physical Activity: Insufficiently Active (2025)    Exercise Vital Sign     Days of Exercise per Week: 2 days     Minutes of Exercise per Session: 20 min   Stress: No Stress Concern Present (2025)    [employer] - Occupational Stress Questionnaire     Feeling of Stress : Only a little   Recent Concern: Stress - Stress Concern Present (2024)    [employer] - Occupational Stress Questionnaire     Feeling of Stress : To some extent   Housing Stability: High Risk (2025)    Housing Stability Vital Sign     Unable to Pay for Housing in the Last Year: Yes     Homeless in the Last Year: No       ROS:  GENERAL: No fever, chills, fatigability or weight loss.  Integument: No rashes, redness, icterus  CHEST: Denies FELTON, cyanosis, wheezing, cough and sputum production.  CARDIOVASCULAR: Denies chest pain, PND, orthopnea or reduced exercise tolerance.  GI: Denies abd pain, dysphagia, nausea, vomiting, no hematemesis   : Denies burning on  "urination, no hematuria, no bacteriuria  MSK: No deformities, swelling, joint pain swelling  Neurologic: No HAs, seizures, weakness, paresthesias, gait problems    PE:  General appearance in NAD  /70   Pulse 68   Temp 98.9 °F (37.2 °C) (Temporal)   Resp 16   Ht 5' 7" (1.702 m)   Wt 93.8 kg (206 lb 12.7 oz)   SpO2 97%   BMI 32.39 kg/m²   Sclera/ Skin anicteric  LN none palpable  AT NC EOMI  Neck supple trachea midline   Chest symmetric, nl excursion, no retractions, breathing comfortably  Abdomen  ND soft NT.  no masses, no organomegaly  EXT - no CCE  Neuro:  Mood/ affect nl, alert and oriented x 3, moves all ext's, gait nl        Assessment:  Multiple colonic adenomas > 10 with large polyp of hepatic flexure not resectable  Good performance status    Plan:  I had a long conversation with the patient and his wife regarding the neoplastic nature of his polyps in the need for definitive surgery given the polypoid mass in the hepatic flexure.  I explained to him that he has more than 10 polyps and this puts him at risk for having an underlying polyposis syndrome.  I recommended that he undergo genetic testing.  I explained to him that if he tests positive for polyposis 1 of the options would be total abdominal colectomy with ileorectal anastomosis.  The other option would be segmental resection with close follow-up.  He prefers the latter even if he tests positive.  He said that he would be willing to undergo colonoscopies yearly if he tests positive.  The details of extended right colectomy, functional consequences and expected hospital course and recuperation were discussed at length.  The risks of surgery including bleeding, need for blood transfusion ileus, bowel obstruction, anastomotic leakage, injury to surrounding structures such as the ureter, and possible need for stoma creation were discussed at length.      "

## 2025-05-15 ENCOUNTER — PATIENT MESSAGE (OUTPATIENT)
Dept: ADMINISTRATIVE | Facility: OTHER | Age: 60
End: 2025-05-15
Payer: COMMERCIAL

## 2025-05-15 DIAGNOSIS — K63.89 MASS OF COLON: Primary | ICD-10-CM

## 2025-05-15 RX ORDER — POLYETHYLENE GLYCOL 3350, SODIUM SULFATE ANHYDROUS, SODIUM BICARBONATE, SODIUM CHLORIDE, POTASSIUM CHLORIDE 236; 22.74; 6.74; 5.86; 2.97 G/4L; G/4L; G/4L; G/4L; G/4L
4 POWDER, FOR SOLUTION ORAL ONCE
Qty: 4000 ML | Refills: 0 | Status: SHIPPED | OUTPATIENT
Start: 2025-05-15 | End: 2025-05-15

## 2025-05-15 RX ORDER — METRONIDAZOLE 500 MG/1
500 TABLET ORAL 2 TIMES DAILY
Qty: 2 TABLET | Refills: 0 | Status: SHIPPED | OUTPATIENT
Start: 2025-05-15

## 2025-05-15 RX ORDER — CIPROFLOXACIN 500 MG/1
500 TABLET, FILM COATED ORAL 2 TIMES DAILY
Qty: 2 TABLET | Refills: 0 | Status: SHIPPED | OUTPATIENT
Start: 2025-05-15

## 2025-05-30 ENCOUNTER — PATIENT MESSAGE (OUTPATIENT)
Dept: HEMATOLOGY/ONCOLOGY | Facility: CLINIC | Age: 60
End: 2025-05-30
Payer: COMMERCIAL

## 2025-06-02 ENCOUNTER — TELEPHONE (OUTPATIENT)
Dept: HEMATOLOGY/ONCOLOGY | Facility: CLINIC | Age: 60
End: 2025-06-02
Payer: COMMERCIAL

## 2025-06-02 ENCOUNTER — PATIENT MESSAGE (OUTPATIENT)
Dept: HEMATOLOGY/ONCOLOGY | Facility: CLINIC | Age: 60
End: 2025-06-02
Payer: COMMERCIAL

## 2025-06-03 ENCOUNTER — TELEPHONE (OUTPATIENT)
Dept: HEMATOLOGY/ONCOLOGY | Facility: CLINIC | Age: 60
End: 2025-06-03
Payer: COMMERCIAL

## 2025-06-03 ENCOUNTER — OFFICE VISIT (OUTPATIENT)
Dept: HEMATOLOGY/ONCOLOGY | Facility: CLINIC | Age: 60
End: 2025-06-03
Payer: COMMERCIAL

## 2025-06-03 ENCOUNTER — PATIENT MESSAGE (OUTPATIENT)
Dept: HEMATOLOGY/ONCOLOGY | Facility: CLINIC | Age: 60
End: 2025-06-03

## 2025-06-03 DIAGNOSIS — K63.89 MASS OF COLON: ICD-10-CM

## 2025-06-03 DIAGNOSIS — Z86.0100 PERSONAL HISTORY OF COLON POLYPS, UNSPECIFIED: ICD-10-CM

## 2025-06-03 DIAGNOSIS — Z71.83 ENCOUNTER FOR NONPROCREATIVE GENETIC COUNSELING AND TESTING: Primary | ICD-10-CM

## 2025-06-03 DIAGNOSIS — Z80.0 FAMILY HISTORY OF PANCREATIC CANCER: ICD-10-CM

## 2025-06-03 DIAGNOSIS — C61 PROSTATE CANCER: ICD-10-CM

## 2025-06-03 DIAGNOSIS — Z13.71 ENCOUNTER FOR NONPROCREATIVE GENETIC COUNSELING AND TESTING: Primary | ICD-10-CM

## 2025-06-03 DIAGNOSIS — Z83.719 FAMILY HISTORY OF COLONIC POLYPS: ICD-10-CM

## 2025-06-05 ENCOUNTER — LAB VISIT (OUTPATIENT)
Dept: LAB | Facility: HOSPITAL | Age: 60
End: 2025-06-05
Payer: COMMERCIAL

## 2025-06-05 DIAGNOSIS — C61 PROSTATE CANCER: ICD-10-CM

## 2025-06-05 DIAGNOSIS — Z83.719 FAMILY HISTORY OF COLONIC POLYPS: ICD-10-CM

## 2025-06-05 DIAGNOSIS — Z80.0 FAMILY HISTORY OF PANCREATIC CANCER: ICD-10-CM

## 2025-06-05 DIAGNOSIS — Z86.0100 PERSONAL HISTORY OF COLON POLYPS, UNSPECIFIED: ICD-10-CM

## 2025-06-05 PROCEDURE — 36415 COLL VENOUS BLD VENIPUNCTURE: CPT | Mod: PN

## 2025-06-11 ENCOUNTER — TELEPHONE (OUTPATIENT)
Dept: SURGERY | Facility: CLINIC | Age: 60
End: 2025-06-11
Payer: COMMERCIAL

## 2025-06-12 ENCOUNTER — TELEPHONE (OUTPATIENT)
Dept: SURGERY | Facility: CLINIC | Age: 60
End: 2025-06-12
Payer: COMMERCIAL

## 2025-06-12 DIAGNOSIS — K63.5 POLYPOSIS OF COLON: Primary | ICD-10-CM

## 2025-06-12 DIAGNOSIS — K63.89 MASS OF HEPATIC FLEXURE OF COLON: ICD-10-CM

## 2025-06-12 RX ORDER — METRONIDAZOLE 500 MG/1
500 TABLET ORAL 2 TIMES DAILY
Qty: 2 TABLET | Refills: 0 | Status: SHIPPED | OUTPATIENT
Start: 2025-06-12

## 2025-06-12 RX ORDER — CIPROFLOXACIN 500 MG/1
500 TABLET, FILM COATED ORAL 2 TIMES DAILY
Qty: 2 TABLET | Refills: 0 | Status: SHIPPED | OUTPATIENT
Start: 2025-06-12

## 2025-06-12 RX ORDER — POLYETHYLENE GLYCOL 3350, SODIUM SULFATE ANHYDROUS, SODIUM BICARBONATE, SODIUM CHLORIDE, POTASSIUM CHLORIDE 236; 22.74; 6.74; 5.86; 2.97 G/4L; G/4L; G/4L; G/4L; G/4L
4 POWDER, FOR SOLUTION ORAL ONCE
Qty: 4000 ML | Refills: 0 | Status: SHIPPED | OUTPATIENT
Start: 2025-06-12 | End: 2025-06-12

## 2025-06-12 NOTE — TELEPHONE ENCOUNTER
RN returned patient's call -- pt states his prep is not at the pharmacy. RN will send in prep and abx to preferred pharmacy. Pt voiced understanding. Advised to call the clinic with additional questions or concerns.

## 2025-06-13 ENCOUNTER — TELEPHONE (OUTPATIENT)
Dept: SURGERY | Facility: CLINIC | Age: 60
End: 2025-06-13
Payer: COMMERCIAL

## 2025-06-13 NOTE — TELEPHONE ENCOUNTER
RN reviewed prep instructions and arrival time for surgery on 6/16 with Dr Mcintosh. Answered questions to patient's satisfaction-- pt voiced understanding of call. Advised to call the clinic with any additional questions or concerns.

## 2025-06-16 PROBLEM — D13.91 POLYPOSIS SYNDROME, FAMILIAL: Status: ACTIVE | Noted: 2025-06-16

## 2025-06-18 PROBLEM — K91.89 POSTOPERATIVE ILEUS: Status: ACTIVE | Noted: 2025-06-18

## 2025-06-18 PROBLEM — Z86.0101 HX OF ADENOMATOUS COLONIC POLYPS: Status: ACTIVE | Noted: 2025-06-16

## 2025-06-18 PROBLEM — K56.7 POSTOPERATIVE ILEUS: Status: ACTIVE | Noted: 2025-06-18

## 2025-06-19 PROBLEM — E86.1 INTRAVASCULAR VOLUME DEPLETION: Status: ACTIVE | Noted: 2025-06-19

## 2025-06-20 PROBLEM — E83.39 HYPOPHOSPHATEMIA: Status: ACTIVE | Noted: 2025-06-20

## 2025-06-20 PROBLEM — G89.18 POST-OP PAIN: Status: ACTIVE | Noted: 2025-06-20

## 2025-06-22 PROBLEM — K59.1 FUNCTIONAL DIARRHEA: Status: ACTIVE | Noted: 2025-06-22

## 2025-06-22 PROBLEM — N17.9 AKI (ACUTE KIDNEY INJURY): Status: ACTIVE | Noted: 2025-06-22

## 2025-06-23 PROBLEM — Z71.89 ACP (ADVANCE CARE PLANNING): Status: ACTIVE | Noted: 2025-06-23

## 2025-06-26 PROBLEM — L98.9 SKIN LESIONS: Status: ACTIVE | Noted: 2025-06-26

## 2025-07-01 ENCOUNTER — TELEPHONE (OUTPATIENT)
Dept: HEMATOLOGY/ONCOLOGY | Facility: CLINIC | Age: 60
End: 2025-07-01
Payer: COMMERCIAL

## 2025-07-01 PROBLEM — K56.609 SBO (SMALL BOWEL OBSTRUCTION): Status: ACTIVE | Noted: 2025-07-01

## 2025-07-01 NOTE — TELEPHONE ENCOUNTER
Copied from CRM #[id number]. Topic: General Inquiry - Patient Advice  >> Jul 1, 2025  7:07 AM Niesha Tatum wrote:  Type: Needs Medical Advice  Who Called:  aj ( wife)  Best Call Back Number: [phone]  Additional Information: patient states he needs a call back a, he has some concerns he needs to address to see if he needs to go to the er , please call to further assist thank you

## 2025-07-01 NOTE — TELEPHONE ENCOUNTER
Pt s/p Laparoscopic total abdominal colectomy with ileorectal anastomosis, omentectomy 6/16/25 by Dr Mcintosh. Pt was readmitted after discharge c/o abdominal cramping and diarrhea, and dx post op ileus, MEJIA.     Pt's wife states he is having lower abdominal pain and nausea with eating and drinking.  Pt is unable to lay down due to pain.    Last BM 11 pm last night was loose.  Denies fever, states last time he passed gas was 1 day ago.  Mucous membranes are dry.    Instructed pt to report to ER. Wife states she will bring him to [facility].  Will update Dr Mcintosh and INOCENCIO Guillen.

## 2025-07-07 ENCOUNTER — TELEPHONE (OUTPATIENT)
Dept: SURGERY | Facility: CLINIC | Age: 60
End: 2025-07-07
Payer: COMMERCIAL

## 2025-07-07 NOTE — TELEPHONE ENCOUNTER
RN returned patient's call -- assisted in scheduling hospital follow up with [facility]. Answered questions to patient's satisfaction, pt voiced understanding of call. Advised to call the clinic with any additional questions or concerns.

## 2025-07-16 ENCOUNTER — OFFICE VISIT (OUTPATIENT)
Dept: SURGERY | Facility: CLINIC | Age: 60
End: 2025-07-16
Payer: COMMERCIAL

## 2025-07-16 VITALS
BODY MASS INDEX: 28.1 KG/M2 | HEIGHT: 65 IN | DIASTOLIC BLOOD PRESSURE: 74 MMHG | TEMPERATURE: 98 F | SYSTOLIC BLOOD PRESSURE: 123 MMHG | HEART RATE: 92 BPM | WEIGHT: 168.63 LBS | OXYGEN SATURATION: 99 % | RESPIRATION RATE: 16 BRPM

## 2025-07-16 DIAGNOSIS — R10.9 ABDOMINAL PAIN, UNSPECIFIED ABDOMINAL LOCATION: ICD-10-CM

## 2025-07-16 DIAGNOSIS — Z90.49 S/P TOTAL COLECTOMY: Primary | ICD-10-CM

## 2025-07-16 DIAGNOSIS — K91.89 POSTOPERATIVE ILEUS: ICD-10-CM

## 2025-07-16 DIAGNOSIS — K56.7 POSTOPERATIVE ILEUS: ICD-10-CM

## 2025-07-16 PROCEDURE — 99999 PR PBB SHADOW E&M-EST. PATIENT-LVL IV: CPT | Mod: PBBFAC,,,

## 2025-07-16 PROCEDURE — 99024 POSTOP FOLLOW-UP VISIT: CPT | Mod: ,,,

## 2025-07-16 NOTE — PROGRESS NOTES
Subjective    Chief complaint:  Post op    History of present illness:   Guillermo Garcia is a 59 y.o. male who is now 4 weeks post op from a Laparoscopic total abdominal colectomy with ileorectal anastomosis, omentectomy for multiple colonic polyps/ hepatic flexure mass. Postoperative course complicated by re-admission to the hospital for norovirus and subsequent readmission with small bowel obstruction requiring PPN. Patient is currently on outpatient PPN and tolerating ensure. Reports he is tolerating PO and having normal bowel and bladder function. Pathology and intraoperative findings reviewed and discussed with patient. See report below.    06- Laparoscopic total abdominal colectomy with ileorectal anastomosis, omentectomy     PATH:   1. TOTAL ABDOMINAL COLECTOMY:   --SESSILE SERRATED ADENOMA WITH HIGH GRADE DYSPLASIA (53 MM)   --MULTIPLE ADDITIONAL TUBULAR ADENOMAS (9).   --ALL MARGINS NEGATIVE FOR DYSPLASIA   --BACKGROUND COLON AND TERMINAL ILEUM WITH NO SIGNIFICANT HISTOPATHOLOGIC FINDINGS   --THIRTY-NINE BENIGN LYMPH NODES.     2. PORTION OF SIGMOID WITH ANASTOMOSIS, EXCISION   --NO SIGNIFICANT HISTOPATHOLOGIC FINDINGS.     3. HERNIA SAC, EXCISION:--HERNIA SAC.     4. PROXIMAL DONUT WITH ANVIL, EXCISION:   --NO SIGNIFICANT HISTOPATHOLOGIC FINDINGS.     5. DISTAL DONUT, EXCISION:--NO SIGNIFICANT HISTOPATHOLOGIC FINDINGS.     IntraOp Findings:  Tattoo of the hepatic flexure  No evidence of distant metastatic disease     07- CT AP: 1. Narrowing at and just proximal to the ileorectal anastomosis with mild wall thickening, however contrast and air flow freely through the anastomosis.  No free air or contrast extravasation.  2. Stable fat stranding in the left upper quadrant.  No abscess.   3. Decreased caliber of small bowel loops following enteric tube placement.    Review of Systems   Constitutional:  Negative for activity change, appetite change, chills, fatigue, fever and unexpected weight  "change.   Respiratory:  Negative for cough and shortness of breath.    Cardiovascular:  Negative for chest pain and leg swelling.   Gastrointestinal:  Negative for abdominal distention, abdominal pain, anal bleeding, blood in stool, constipation, diarrhea, nausea, rectal pain and vomiting.   Genitourinary:  Negative for difficulty urinating, dysuria and flank pain.   Musculoskeletal:  Negative for arthralgias, gait problem and myalgias.   Skin:  Negative for color change and rash.   Neurological:  Negative for dizziness, seizures, weakness, numbness and headaches.   Hematological:  Negative for adenopathy. Does not bruise/bleed easily.        Review of patient's allergies indicates:  No Known Allergies   Current Medications[1]     Past Medical History[2]     Past Surgical History[3]     Social History[4]     Family History   Problem Relation Name Age of Onset    Diabetes Mother Vivek     Hypertension Mother Vivek     Colon polyps Father Jesus     Diabetes Father Jesus     Hypertension Father Jesus     Asthma Father Jesus     Pancreatic cancer Paternal Grandfather  70 - 79    Colon polyps Brother      Pancreatic cancer Maternal Aunt          early 70s    Breast cancer Maternal Aunt          60s  double mastectomy    Prostate cancer Other             Objective    /74 (BP Location: Left arm, Patient Position: Sitting)   Pulse 92   Temp 98 °F (36.7 °C) (Temporal)   Resp 16   Ht 5' 5" (1.651 m)   Wt 76.5 kg (168 lb 10.4 oz)   SpO2 99%   BMI 28.07 kg/m²     Physical Exam  Vitals reviewed.   Constitutional:       General: He is not in acute distress.     Appearance: Normal appearance. He is not ill-appearing.   HENT:      Head: Normocephalic.   Eyes:      General: No scleral icterus.     Extraocular Movements: Extraocular movements intact.      Pupils: Pupils are equal, round, and reactive to light.   Cardiovascular:      Rate and Rhythm: Normal rate.   Pulmonary:      Effort: Pulmonary effort is normal. "   Abdominal:      General: There is no distension.      Palpations: Abdomen is soft.      Tenderness: There is no abdominal tenderness.      Comments: Incisions clean, dry, intact, and without erythema.     Musculoskeletal:         General: No swelling or deformity. Normal range of motion.   Skin:     Coloration: Skin is not jaundiced.   Neurological:      Mental Status: He is alert and oriented to person, place, and time. Mental status is at baseline.      Motor: No weakness.      Gait: Gait normal.   Psychiatric:         Mood and Affect: Mood normal.         Behavior: Behavior normal.         Assessment & Plan  S/P total colectomy  Patient is healing well from surgery.   Final surgical pathology reviewed and patient provided printed copy.     Continue post-operative care.   No lifting >10 lbs or strenuous exercise until 6-8 weeks post-cp. Continue to increase activity as tolerated.    Ok to drive if no longer taking opioid pain medications.    Postoperative ileus  Resolved. Plan to introduce full liquids and discontinue PPN once tolerating.   Abdominal pain, unspecified abdominal location  Reports abdominal pain after coughing spell. Pain is improving.        Orders Placed This Encounter    XR ABDOMEN  ACUTE 2 OR MORE VIEWS WITH CHEST        Follow up in about 3 weeks (around 8/6/2025).     TIANA Grover          [1]   Current Outpatient Medications   Medication Sig Dispense Refill    acetaminophen (TYLENOL) 325 MG tablet Take 2 tablets (650 mg total) by mouth every 6 (six) hours as needed for Pain. (Patient taking differently: Take 2 tablets by mouth every 6 (six) hours as needed (mild pain <5/10). Indications: mild pain <5/10)      FLUoxetine 20 MG capsule Take 1 capsule (20 mg total) by mouth once daily. 90 capsule 2    gabapentin (NEURONTIN) 300 MG capsule Take 1 capsule (300 mg total) by mouth 3 (three) times daily. for 7 days 21 capsule 0    meclizine (ANTIVERT) 50 MG tablet Take 50 mg by mouth 3  (three) times daily as needed for Dizziness or Nausea.      mupirocin (BACTROBAN) 2 % ointment Apply 1 g topically 2 (two) times daily. Apply to lesion on back      ondansetron (ZOFRAN-ODT) 4 MG TbDL Take 4 mg by mouth every 8 (eight) hours as needed (nausea).      simethicone (MYLICON) 80 MG chewable tablet Take 1 tablet (80 mg total) by mouth every 6 (six) hours as needed for Flatulence (indigestion/gas pain).      tadalafiL (CIALIS) 20 MG Tab Take 20 mg by mouth every 48 hours as needed (ED).      traMADoL (ULTRAM) 50 mg tablet Take 1 tablet (50 mg total) by mouth every 12 (twelve) hours as needed for Pain. (Patient not taking: Reported on 7/28/2025) 6 each 0     No current facility-administered medications for this visit.   [2]   Past Medical History:  Diagnosis Date    Prostate cancer 10/2024   [3]   Past Surgical History:  Procedure Laterality Date    ANASTOMOSIS OF ILEUM TO RECTUM N/A 6/16/2025    Procedure: ANASTOMOSIS, ILEORECTAL;  Surgeon: JENNYFER Mcintosh MD;  Location: [facility] OR;  Service: Colon and Rectal;  Laterality: N/A;    APPENDECTOMY      COLONOSCOPY, SCREENING, LOW RISK PATIENT N/A 5/8/2025    Procedure: COLONOSCOPY, SCREENING, LOW RISK PATIENT;  Surgeon: JENNYFER Mcintosh MD;  Location: [facility];  Service: Endoscopy;  Laterality: N/A;    FLEXIBLE SIGMOIDOSCOPY N/A 6/16/2025    Procedure: SIGMOIDOSCOPY, FLEXIBLE;  Surgeon: JENNYFER Mcintosh MD;  Location: [facility] OR;  Service: Colon and Rectal;  Laterality: N/A;    LAPAROSCOPIC TOTAL COLECTOMY N/A 6/16/2025    Procedure: COLECTOMY, TOTAL, LAPAROSCOPIC ABDOMINAL;  Surgeon: JENNYFER Mcintosh MD;  Location: [facility] OR;  Service: Colon and Rectal;  Laterality: N/A;    OMENTECTOMY N/A 6/16/2025    Procedure: OMENTECTOMY;  Surgeon: JENNYFER Mcintosh MD;  Location: [facility] OR;  Service: Colon and Rectal;  Laterality: N/A;    PROSTATE SURGERY      ROBOT-ASSISTED LYMPHADENECTOMY N/A 12/19/2024    Procedure: ROBOTIC LYMPHADENECTOMY;  Surgeon: Nic Zhou,  MD;  Location: [facility];  Service: Urology;  Laterality: N/A;    ROBOTIC PROSTATECTOMY, RADICAL, RETROPUBIC APPROACH N/A 2024    Procedure: ROBOTIC PROSTATECTOMY, RADICAL;  Surgeon: Nic Zhou MD;  Location: [facility];  Service: Urology;  Laterality: N/A;   [4]   Social History  Tobacco Use    Smoking status: Former     Current packs/day: 0.00     Average packs/day: 1.5 packs/day for 20.7 years (31.1 ttl pk-yrs)     Types: Cigarettes     Start date: 1986     Quit date: 10/1/2006     Years since quittin.8     Passive exposure: Never    Smokeless tobacco: Never    Tobacco comments:     Quit smoking 15 years ago.    Vaping Use    Vaping status: Never Used   Substance Use Topics    Alcohol use: Yes     Comment: occasional beer    Drug use: Never

## 2025-07-18 ENCOUNTER — TELEPHONE (OUTPATIENT)
Dept: SURGERY | Facility: CLINIC | Age: 60
End: 2025-07-18

## 2025-07-18 NOTE — TELEPHONE ENCOUNTER
Copied from CRM #[id number]. Topic: General Inquiry - Return Call  >> Jul 18, 2025  4:03 PM Yashira wrote:  Type:  Patient Returning Call    Who Called:  Safia   Who Left Message for Patient:  Aurora   Does the patient know what this is regarding?:  beverley  Best Call Back Number:  [phone]  Additional Information:

## 2025-07-18 NOTE — TELEPHONE ENCOUNTER
Copied from CRM #[id number]. Topic: General Inquiry - Patient Advice  >> Jul 18, 2025 11:39 AM Kim wrote:  Type:  Needs Medical Advice    Who Called: wife Safia    Would the patient rather a call back or a response via [facility]? CB  Best Call Back Number:[phone] Safia  Additional Information: requesting a call back, need to r/s 7/30 appt due to Ins

## 2025-07-21 ENCOUNTER — TELEPHONE (OUTPATIENT)
Dept: SURGERY | Facility: CLINIC | Age: 60
End: 2025-07-21

## 2025-07-21 NOTE — TELEPHONE ENCOUNTER
Copied from CRM #[id number]. Topic: General Inquiry - Patient Advice  >> Jul 21, 2025  8:26 AM Iza wrote:  Type:  Needs Medical Advice    Who Called: spouse   Symptoms (please be specific):    How long has patient had these symptoms:    Pharmacy name and phone #:    Would the patient rather a call back or a response via [facility]? call  Best Call Back Number: [phone]    Additional Information: pt is changing insurance , and would like to r/s appointment

## 2025-07-22 ENCOUNTER — TELEPHONE (OUTPATIENT)
Dept: SURGERY | Facility: CLINIC | Age: 60
End: 2025-07-22

## 2025-07-22 NOTE — TELEPHONE ENCOUNTER
RN received incoming call from Hal STUART RN-- pt has tolerated clear liquid diet, soft diet, and ensures -- next office visit with Dr Mcintosh/ INOCENCIO Guillen is scheduled for 8/6 -- RN would like to know when picc needs to be pulled? Updates on tpn?    RN will send message to INOCENCIO Guillen to advise.

## 2025-08-05 NOTE — PROGRESS NOTES
Subjective    Chief complaint:  Post op    History of present illness:   Guillermo Garcia is a 59 y.o. male who is now 7 weeks post op from a Laparoscopic total abdominal colectomy with ileorectal anastomosis, omentectomy for multiple colonic polyps/ hepatic flexure mass. Postoperative course complicated by re-admission to the hospital for norovirus and subsequent readmission with small bowel obstruction requiring PPN.     Patient has completed PPN and currently tolerating regular diet. Reports LUQ discomfort near port site which is mild but bothersome at times. Reports 3-5 loose stools per day. Has had occasionally times at night where he is not able to get to the bathroom, but denies full fecal incontinence and otherwise has not had issues.     06- Laparoscopic total abdominal colectomy with ileorectal anastomosis, omentectomy     PATH:   1. TOTAL ABDOMINAL COLECTOMY:   --SESSILE SERRATED ADENOMA WITH HIGH GRADE DYSPLASIA (53 MM)   --MULTIPLE ADDITIONAL TUBULAR ADENOMAS (9).   --ALL MARGINS NEGATIVE FOR DYSPLASIA   --BACKGROUND COLON AND TERMINAL ILEUM WITH NO SIGNIFICANT HISTOPATHOLOGIC FINDINGS   --THIRTY-NINE BENIGN LYMPH NODES.     2. PORTION OF SIGMOID WITH ANASTOMOSIS, EXCISION   --NO SIGNIFICANT HISTOPATHOLOGIC FINDINGS.     3. HERNIA SAC, EXCISION:--HERNIA SAC.     4. PROXIMAL DONUT WITH ANVIL, EXCISION:   --NO SIGNIFICANT HISTOPATHOLOGIC FINDINGS.     5. DISTAL DONUT, EXCISION:--NO SIGNIFICANT HISTOPATHOLOGIC FINDINGS.     IntraOp Findings:  Tattoo of the hepatic flexure  No evidence of distant metastatic disease     07- CT AP: 1. Narrowing at and just proximal to the ileorectal anastomosis with mild wall thickening, however contrast and air flow freely through the anastomosis.  No free air or contrast extravasation.  2. Stable fat stranding in the left upper quadrant.  No abscess.   3. Decreased caliber of small bowel loops following enteric tube placement.    Review of Systems  "  Constitutional:  Negative for activity change, appetite change, chills, fatigue, fever and unexpected weight change.   Respiratory:  Negative for cough and shortness of breath.    Cardiovascular:  Negative for chest pain and leg swelling.   Gastrointestinal:  Negative for abdominal distention, abdominal pain, anal bleeding, blood in stool, constipation, diarrhea, nausea, rectal pain and vomiting.   Genitourinary:  Negative for difficulty urinating, dysuria and flank pain.   Musculoskeletal:  Negative for arthralgias, gait problem and myalgias.   Skin:  Negative for color change and rash.   Neurological:  Negative for dizziness, seizures, weakness, numbness and headaches.   Hematological:  Negative for adenopathy. Does not bruise/bleed easily.        Review of patient's allergies indicates:  No Known Allergies   Current Medications[1]     Past Medical History[2]     Past Surgical History[3]     Social History[4]     Family History   Problem Relation Name Age of Onset    Diabetes Mother Vivek     Hypertension Mother Vivek     Colon polyps Father Jesus     Diabetes Father Jesus     Hypertension Father Jesus     Asthma Father Jesus     Pancreatic cancer Paternal Grandfather  70 - 79    Colon polyps Brother      Pancreatic cancer Maternal Aunt          early 70s    Breast cancer Maternal Aunt          60s  double mastectomy    Prostate cancer Other             Objective    /73 (BP Location: Right arm, Patient Position: Sitting)   Pulse 86   Temp 98.3 °F (36.8 °C) (Temporal)   Resp 18   Ht 5' 5" (1.651 m)   Wt 76.7 kg (169 lb 1.5 oz)   SpO2 98%   BMI 28.14 kg/m²     Physical Exam  Vitals reviewed.   Constitutional:       General: He is not in acute distress.     Appearance: Normal appearance. He is not ill-appearing.   HENT:      Head: Normocephalic.   Eyes:      General: No scleral icterus.     Extraocular Movements: Extraocular movements intact.      Pupils: Pupils are equal, round, and reactive to " light.   Cardiovascular:      Rate and Rhythm: Normal rate.   Pulmonary:      Effort: Pulmonary effort is normal.   Abdominal:      General: There is no distension.      Palpations: Abdomen is soft.      Tenderness: There is no abdominal tenderness.      Comments: Incisions clean, dry, intact, and without erythema.     Musculoskeletal:         General: No swelling or deformity. Normal range of motion.   Skin:     Coloration: Skin is not jaundiced.   Neurological:      Mental Status: He is alert and oriented to person, place, and time. Mental status is at baseline.      Motor: No weakness.      Gait: Gait normal.   Psychiatric:         Mood and Affect: Mood normal.         Behavior: Behavior normal.         Assessment & Plan  S/P total colectomy  Continues to heal well from surgery.   Suspect abdominal discomfort is related to port site at the time of surgery and anticipate this will improve over time. If not improving or worsening will obtain CT.   Abdominal XR reviewed and within normal limits.   Start Imodium twice daily to help with frequent, loose bowel movements.   Ok to return to work at limited capacity. Back to full workload in 4 weeks.   OV in 3 months with Dr. Mcintosh.              Follow up in about 3 months (around 11/6/2025).     TIANA Grover              [1]   Current Outpatient Medications   Medication Sig Dispense Refill    acetaminophen (TYLENOL) 325 MG tablet Take 2 tablets (650 mg total) by mouth every 6 (six) hours as needed for Pain. (Patient taking differently: Take 650 mg by mouth every 6 (six) hours as needed (mild pain <5/10).)      FLUoxetine 20 MG capsule Take 1 capsule (20 mg total) by mouth once daily. 90 capsule 2    gabapentin (NEURONTIN) 300 MG capsule Take 1 capsule (300 mg total) by mouth 3 (three) times daily. for 7 days 21 capsule 0    meclizine (ANTIVERT) 50 MG tablet Take 50 mg by mouth 3 (three) times daily as needed for Dizziness or Nausea.      mupirocin (BACTROBAN) 2 %  ointment Apply 1 g topically 2 (two) times daily. Apply to lesion on back      ondansetron (ZOFRAN-ODT) 4 MG TbDL Take 4 mg by mouth every 8 (eight) hours as needed (nausea).      simethicone (MYLICON) 80 MG chewable tablet Take 1 tablet (80 mg total) by mouth every 6 (six) hours as needed for Flatulence (indigestion/gas pain).      tadalafiL (CIALIS) 20 MG Tab Take 20 mg by mouth every 48 hours as needed (ED).       No current facility-administered medications for this visit.   [2]   Past Medical History:  Diagnosis Date    Prostate cancer 10/2024   [3]   Past Surgical History:  Procedure Laterality Date    ANASTOMOSIS OF ILEUM TO RECTUM N/A 6/16/2025    Procedure: ANASTOMOSIS, ILEORECTAL;  Surgeon: JENNYFER Mcintosh MD;  Location: [facility] OR;  Service: Colon and Rectal;  Laterality: N/A;    APPENDECTOMY      COLONOSCOPY, SCREENING, LOW RISK PATIENT N/A 5/8/2025    Procedure: COLONOSCOPY, SCREENING, LOW RISK PATIENT;  Surgeon: JENNYFER Mcintosh MD;  Location: [facility] ENDO;  Service: Endoscopy;  Laterality: N/A;    FLEXIBLE SIGMOIDOSCOPY N/A 6/16/2025    Procedure: SIGMOIDOSCOPY, FLEXIBLE;  Surgeon: JENNYFER Mcintosh MD;  Location: [facility] OR;  Service: Colon and Rectal;  Laterality: N/A;    LAPAROSCOPIC TOTAL COLECTOMY N/A 6/16/2025    Procedure: COLECTOMY, TOTAL, LAPAROSCOPIC ABDOMINAL;  Surgeon: JENNYFER Mcintosh MD;  Location: [facility] OR;  Service: Colon and Rectal;  Laterality: N/A;    OMENTECTOMY N/A 6/16/2025    Procedure: OMENTECTOMY;  Surgeon: JENNYFER Mcintosh MD;  Location: [facility] OR;  Service: Colon and Rectal;  Laterality: N/A;    PROSTATE SURGERY      ROBOT-ASSISTED LYMPHADENECTOMY N/A 12/19/2024    Procedure: ROBOTIC LYMPHADENECTOMY;  Surgeon: Nic Zhou MD;  Location: [facility] OR;  Service: Urology;  Laterality: N/A;    ROBOTIC PROSTATECTOMY, RADICAL, RETROPUBIC APPROACH N/A 12/19/2024    Procedure: ROBOTIC PROSTATECTOMY, RADICAL;  Surgeon: Nic Zhou MD;  Location: [facility];  Service: Urology;   Laterality: N/A;   [4]   Social History  Tobacco Use    Smoking status: Former     Current packs/day: 0.00     Average packs/day: 1.5 packs/day for 20.7 years (31.1 ttl pk-yrs)     Types: Cigarettes     Start date: 1986     Quit date: 10/1/2006     Years since quittin.8     Passive exposure: Never    Smokeless tobacco: Never    Tobacco comments:     Quit smoking 15 years ago.    Vaping Use    Vaping status: Never Used   Substance Use Topics    Alcohol use: Yes     Comment: occasional beer    Drug use: Never

## 2025-08-06 ENCOUNTER — HOSPITAL ENCOUNTER (OUTPATIENT)
Dept: RADIOLOGY | Facility: HOSPITAL | Age: 60
Discharge: HOME | End: 2025-08-06
Payer: COMMERCIAL

## 2025-08-06 ENCOUNTER — OFFICE VISIT (OUTPATIENT)
Dept: SURGERY | Facility: CLINIC | Age: 60
End: 2025-08-06
Payer: COMMERCIAL

## 2025-08-06 VITALS
OXYGEN SATURATION: 98 % | DIASTOLIC BLOOD PRESSURE: 73 MMHG | WEIGHT: 169.06 LBS | HEART RATE: 86 BPM | HEIGHT: 65 IN | RESPIRATION RATE: 18 BRPM | BODY MASS INDEX: 28.17 KG/M2 | SYSTOLIC BLOOD PRESSURE: 106 MMHG | TEMPERATURE: 98 F

## 2025-08-06 DIAGNOSIS — Z90.49 STATUS POST COLECTOMY: ICD-10-CM

## 2025-08-06 DIAGNOSIS — Z90.49 S/P TOTAL COLECTOMY: Primary | ICD-10-CM

## 2025-08-06 DIAGNOSIS — Z90.49 S/P TOTAL COLECTOMY: ICD-10-CM

## 2025-08-06 DIAGNOSIS — R10.9 ABDOMINAL PAIN, UNSPECIFIED ABDOMINAL LOCATION: ICD-10-CM

## 2025-08-06 DIAGNOSIS — R05.9 COUGH IN ADULT PATIENT: ICD-10-CM

## 2025-08-06 PROCEDURE — 99999 PR PBB SHADOW E&M-EST. PATIENT-LVL IV: CPT | Mod: PBBFAC,,,

## 2025-08-06 PROCEDURE — 3078F DIAST BP <80 MM HG: CPT | Mod: CPTII,S$GLB,,

## 2025-08-06 PROCEDURE — 74019 RADEX ABDOMEN 2 VIEWS: CPT | Mod: 26,,, | Performed by: STUDENT IN AN ORGANIZED HEALTH CARE EDUCATION/TRAINING PROGRAM

## 2025-08-06 PROCEDURE — 3044F HG A1C LEVEL LT 7.0%: CPT | Mod: CPTII,S$GLB,,

## 2025-08-06 PROCEDURE — 71046 X-RAY EXAM CHEST 2 VIEWS: CPT | Mod: TC,PO

## 2025-08-06 PROCEDURE — 3074F SYST BP LT 130 MM HG: CPT | Mod: CPTII,S$GLB,,

## 2025-08-06 PROCEDURE — 74019 RADEX ABDOMEN 2 VIEWS: CPT | Mod: TC,PO

## 2025-08-06 PROCEDURE — 71046 X-RAY EXAM CHEST 2 VIEWS: CPT | Mod: 26,,, | Performed by: RADIOLOGY

## 2025-08-06 PROCEDURE — 99024 POSTOP FOLLOW-UP VISIT: CPT | Mod: S$GLB,,,

## 2025-08-06 PROCEDURE — 1159F MED LIST DOCD IN RCRD: CPT | Mod: CPTII,S$GLB,,

## 2025-08-08 ENCOUNTER — LAB VISIT (OUTPATIENT)
Dept: LAB | Facility: HOSPITAL | Age: 60
End: 2025-08-08
Attending: COLON & RECTAL SURGERY
Payer: COMMERCIAL

## 2025-08-08 DIAGNOSIS — Z00.00 PREVENTATIVE HEALTH CARE: Primary | ICD-10-CM

## 2025-08-08 DIAGNOSIS — K27.0 ACUTE PEPTIC ULCER WITH HEMORRHAGE AND OBSTRUCTION: ICD-10-CM

## 2025-08-08 DIAGNOSIS — K56.609 ACUTE PEPTIC ULCER WITH HEMORRHAGE AND OBSTRUCTION: ICD-10-CM

## 2025-08-08 DIAGNOSIS — Z00.00 PREVENTATIVE HEALTH CARE: ICD-10-CM

## 2025-08-08 LAB
ALBUMIN SERPL BCP-MCNC: 3.1 G/DL (ref 3.5–5.2)
ALP SERPL-CCNC: 99 UNIT/L (ref 40–150)
ALT SERPL W/O P-5'-P-CCNC: 25 UNIT/L (ref 0–55)
ANION GAP (OHS): 11 MMOL/L (ref 8–16)
AST SERPL-CCNC: 21 UNIT/L (ref 0–50)
BILIRUB SERPL-MCNC: 0.2 MG/DL (ref 0.1–1)
BUN SERPL-MCNC: 30 MG/DL (ref 6–20)
CALCIUM SERPL-MCNC: 8.8 MG/DL (ref 8.7–10.5)
CHLORIDE SERPL-SCNC: 108 MMOL/L (ref 95–110)
CO2 SERPL-SCNC: 19 MMOL/L (ref 23–29)
CREAT SERPL-MCNC: 1.1 MG/DL (ref 0.5–1.4)
GFR SERPLBLD CREATININE-BSD FMLA CKD-EPI: >60 ML/MIN/1.73/M2
GLUCOSE SERPL-MCNC: 112 MG/DL (ref 70–110)
POTASSIUM SERPL-SCNC: 3 MMOL/L (ref 3.5–5.1)
PROT SERPL-MCNC: 6.4 GM/DL (ref 6–8.4)
SODIUM SERPL-SCNC: 138 MMOL/L (ref 136–145)

## 2025-08-08 PROCEDURE — 80053 COMPREHEN METABOLIC PANEL: CPT

## 2025-08-08 PROCEDURE — 36415 COLL VENOUS BLD VENIPUNCTURE: CPT | Mod: PO
